# Patient Record
Sex: FEMALE | Race: WHITE | Employment: OTHER | URBAN - METROPOLITAN AREA
[De-identification: names, ages, dates, MRNs, and addresses within clinical notes are randomized per-mention and may not be internally consistent; named-entity substitution may affect disease eponyms.]

---

## 2023-02-05 ENCOUNTER — HOSPITAL ENCOUNTER (EMERGENCY)
Age: 68
Discharge: HOME | End: 2023-02-05
Attending: EMERGENCY MEDICINE
Payer: MEDICARE

## 2023-02-05 VITALS
DIASTOLIC BLOOD PRESSURE: 78 MMHG | BODY MASS INDEX: 27.6 KG/M2 | WEIGHT: 150 LBS | TEMPERATURE: 98 F | SYSTOLIC BLOOD PRESSURE: 154 MMHG | HEIGHT: 62 IN | HEART RATE: 92 BPM | OXYGEN SATURATION: 98 % | RESPIRATION RATE: 18 BRPM

## 2023-02-05 DIAGNOSIS — R04.0 EPISTAXIS: Primary | ICD-10-CM

## 2023-02-05 LAB
BASOPHILS # BLD: 0.1 K/UL (ref 0–0.1)
BASOPHILS NFR BLD: 1 % (ref 0–2)
DIFFERENTIAL METHOD BLD: NORMAL
EOSINOPHIL # BLD: 0.1 K/UL (ref 0–0.4)
EOSINOPHIL NFR BLD: 1 % (ref 0–5)
ERYTHROCYTE [DISTWIDTH] IN BLOOD BY AUTOMATED COUNT: 14.2 % (ref 11.6–14.5)
HCT VFR BLD AUTO: 38.5 % (ref 35–45)
HGB BLD-MCNC: 12.7 G/DL (ref 12–16)
IMM GRANULOCYTES # BLD AUTO: 0 K/UL (ref 0–0.04)
IMM GRANULOCYTES NFR BLD AUTO: 0 % (ref 0–0.5)
INR PPP: 0.9 (ref 0.8–1.2)
LYMPHOCYTES # BLD: 2.4 K/UL (ref 0.9–3.6)
LYMPHOCYTES NFR BLD: 27 % (ref 21–52)
MCH RBC QN AUTO: 26.3 PG (ref 24–34)
MCHC RBC AUTO-ENTMCNC: 33 G/DL (ref 31–37)
MCV RBC AUTO: 79.7 FL (ref 78–100)
MONOCYTES # BLD: 0.4 K/UL (ref 0.05–1.2)
MONOCYTES NFR BLD: 5 % (ref 3–10)
NEUTS SEG # BLD: 6 K/UL (ref 1.8–8)
NEUTS SEG NFR BLD: 67 % (ref 40–73)
NRBC # BLD: 0 K/UL (ref 0–0.01)
NRBC BLD-RTO: 0 PER 100 WBC
PLATELET # BLD AUTO: 364 K/UL (ref 135–420)
PMV BLD AUTO: 9.4 FL (ref 9.2–11.8)
PROTHROMBIN TIME: 12.1 SEC (ref 11.5–15.2)
RBC # BLD AUTO: 4.83 M/UL (ref 4.2–5.3)
WBC # BLD AUTO: 9 K/UL (ref 4.6–13.2)

## 2023-02-05 PROCEDURE — 85025 COMPLETE CBC W/AUTO DIFF WBC: CPT

## 2023-02-05 PROCEDURE — 85610 PROTHROMBIN TIME: CPT

## 2023-02-05 PROCEDURE — 30901 CONTROL OF NOSEBLEED: CPT

## 2023-02-05 PROCEDURE — 74011250636 HC RX REV CODE- 250/636: Performed by: EMERGENCY MEDICINE

## 2023-02-05 PROCEDURE — 99283 EMERGENCY DEPT VISIT LOW MDM: CPT

## 2023-02-05 PROCEDURE — C9046 COCAINE HCL NASAL SOLUTION: HCPCS | Performed by: EMERGENCY MEDICINE

## 2023-02-05 RX ORDER — AMOXICILLIN 500 MG/1
500 TABLET, FILM COATED ORAL 3 TIMES DAILY
Qty: 21 TABLET | Refills: 0 | Status: SHIPPED | OUTPATIENT
Start: 2023-02-05

## 2023-02-05 RX ORDER — COCAINE HYDROCHLORIDE 40 MG/ML
SOLUTION NASAL
Status: COMPLETED | OUTPATIENT
Start: 2023-02-05 | End: 2023-02-05

## 2023-02-05 RX ADMIN — COCAINE HYDROCHLORIDE 4 ML: 40 SOLUTION NASAL at 02:50

## 2023-02-05 NOTE — ED TRIAGE NOTES
Pt reports that epistaxis started around 0015. Pt used flonase around 1330 pm yesterday afternoon. Epistaxis controlled in triage.

## 2023-02-06 NOTE — ED PROVIDER NOTES
[facility] EMERGENCY DEPT  EMERGENCY DEPARTMENT ENCOUNTER       Pt Name: Miguel Rodney  MRN: [id number]  [place] 1955  Date of evaluation: 2/5/2023  Provider: Nixon Marino MD   PCP: Apolinar Henriquez MD  Note Started: 2:41 PM 2/6/23     CHIEF COMPLAINT       Chief Complaint   Patient presents with    Epistaxis        HISTORY OF PRESENT ILLNESS: 1 or more elements      History From: Patient  HPI Limitations : None     Miguel Rodney is a 79 y.o. female who presents to ED complaining of nosebleed. Patient states the nosebleed may have something to do with her Flonase which she used yesterday afternoon about 130 for nasal congestion. She noticed a nosebleed about midnight tonight. She reports increased nosebleed. She applied pressure to her nose with no relief. She denies any headache, sore throat, cough, chest or abdomen pain. Not on blood thinners. Nursing Notes were all reviewed and agreed with or any disagreements were addressed in the HPI. REVIEW OF SYSTEMS      Review of Systems     Positives and Pertinent negatives as per HPI. PAST HISTORY     Past Medical History:  No past medical history on file. Past Surgical History:  No past surgical history on file. Family History:  No family history on file. Social History: Allergies: Allergies   Allergen Reactions    Sulfa (Sulfonamide Antibiotics) Rash       CURRENT MEDICATIONS      Discharge Medication List as of 2/5/2023  4:47 AM          SCREENINGS               No data recorded         PHYSICAL EXAM      Vitals:    02/05/23 0207 02/05/23 0224 02/05/23 0302 02/05/23 0354   BP:  (!) 155/79 (!) 162/63 (!) 154/78   Pulse: 92      Resp:       Temp:       SpO2: 96% 100% 99% 98%   Weight:       Height:         Physical Exam    Nursing notes and vital signs reviewed  Constitutional: Anxious female in no acute distress. Nosebleed is controlled however she has small fresh clots both nostrils.   Non toxic appearing, moderate distress, no blood noted in the pharynx. Head: Normocephalic, Atraumatic  Eyes: EOMI  Neck: Supple  Cardiovascular: Regular rate and rhythm, no murmurs, rubs, or gallops  Chest: Normal work of breathing and chest excursion bilaterally  Lungs: Clear to ausculation bilaterally  Abdomen: Soft, non tender, non distended, normoactive bowel sounds  Back: No evidence of trauma or deformity  Extremities: No evidence of trauma or deformity, no LE edema  Skin: Warm and dry, normal cap refill  Neuro: Alert and appropriate, CN intact, normal speech, strength and sensation full and symmetric bilaterally, normal gait, normal coordination  Psychiatric: Normal mood and affect     DIAGNOSTIC RESULTS   LABS:     No results found for this or any previous visit (from the past 12 hour(s)). RADIOLOGY:  Non-plain film images such as CT, Ultrasound and MRI are read by the radiologist. Plain radiographic images are visualized and preliminarily interpreted by the ED Provider with the below findings:          Interpretation per the Radiologist below, if available at the time of this note:     No results found.       PROCEDURES   Unless otherwise noted below, none  Epistaxis Management    Date/Time: 2/5/2023 3:55 AM  Performed by: Katie Matthews MD  Authorized by: Katie Matthews MD     Consent:     Consent obtained:  Verbal    Consent given by:  Patient    Risks, benefits, and alternatives were discussed: yes      Risks discussed:  Bleeding, infection, nasal injury and pain    Alternatives discussed:  Alternative treatment  Universal protocol:     Procedure explained and questions answered to patient or proxy's satisfaction: yes      Relevant documents present and verified: yes      Test results available: yes      Imaging studies available: yes      Required blood products, implants, devices, and special equipment available: yes      Site/side marked: yes      Immediately prior to procedure, a time out was called: yes      Patient identity confirmed:  Verbally with patient and arm band  Anesthesia:     Anesthesia method:  Topical application    Topical anesthetic:  Cocaine  Procedure details:     Treatment site:  Unable to specify    Repair method: Small clots in each nostril where removed by patient blowing out each nostril. After removal of the clots, quadrant balls soaked with cocaine well applied in each nostril and removed after about 20 minutes. Treatment complexity:  Limited    Treatment episode: initial    Post-procedure details:     Assessment:  Bleeding stopped (After removal of the quadrant both, reassessment showed no further bleeding.)    Procedure completion:  Tolerated well, no immediate complications  Comments:      Since no source of bleeding was noted, and bleeding stopped after cocaine, discussed with patient option of prophylactically packing nose or leaving it alone and patient opted to leave off packing, advised with return for packing for any further bleeding. She was discharged on amoxicillin, she was advised to return for any further bleeding and was referred to ENT, Dr. Aura Duque. CRITICAL CARE TIME       EMERGENCY DEPARTMENT COURSE and DIFFERENTIAL DIAGNOSIS/MDM   Vitals:    Vitals:    02/05/23 0207 02/05/23 0224 02/05/23 0302 02/05/23 0354   BP:  (!) 155/79 (!) 162/63 (!) 154/78   Pulse: 92      Resp:       Temp:       SpO2: 96% 100% 99% 98%   Weight:       Height:            Patient was given the following medications:  Medications   cocaine 4 % nasal solution (4 mL Topical Given 2/5/23 0250)       CONSULTS: (Who and What was discussed)  None    Chronic Conditions: as above    Social Determinants affecting Dx or Tx: None    Records Reviewed (source and summary): Prior medical records, Previous Laboratory studies, and Nursing notes    CC/HPI Summary, DDx, ED Course, and Reassessment:     Patient presented with a nosebleed, she had used Flonase several hours prior. She also has had congestion of the nose. She was unable to stop bleeding at home but bleeding it stopped during ED evaluation. She however still had fresh clots in both nostrils which were removed and cocaine applied in each nostril. No further bleeding noted after removal of the cotton balls. Patient discharged on amoxicillin with referral to ENT and given precautions for returning to ED. Disposition Considerations (Tests not done, Shared Decision Making, Pt Expectation of Test or Tx.):      FINAL IMPRESSION     1. Epistaxis          DISPOSITION/PLAN   Discharged    Discharge Note: The patient is stable for discharge home. The signs, symptoms, diagnosis, and discharge instructions have been discussed, understanding conveyed, and agreed upon. The patient is to follow up as recommended or return to ER should their symptoms worsen. PATIENT REFERRED TO:  Follow-up Information       Follow up With Specialties Details Why Contact Info    Sanjuana Martinez MD Otolaryngology, Surgery Physician In 2 days  [address] [facility] [address] [id number]  [phone]      [facility] EMERGENCY DEPT Emergency Medicine  If symptoms worsen [address] [place]  [phone]              DISCHARGE MEDICATIONS:  Discharge Medication List as of 2/5/2023  4:47 AM            DISCONTINUED MEDICATIONS:  Discharge Medication List as of 2/5/2023  4:47 AM          I am the Primary Clinician of Record. Leslie Urbano MD (electronically signed)    (Please note that parts of this dictation were completed with voice recognition software. Quite often unanticipated grammatical, syntax, homophones, and other interpretive errors are inadvertently transcribed by the computer software. Please disregards these errors.  Please excuse any errors that have escaped final proofreading.)